# Patient Record
Sex: FEMALE
[De-identification: names, ages, dates, MRNs, and addresses within clinical notes are randomized per-mention and may not be internally consistent; named-entity substitution may affect disease eponyms.]

---

## 2019-10-14 ENCOUNTER — HOSPITAL ENCOUNTER (INPATIENT)
Dept: HOSPITAL 90 - LDH | Age: 29
LOS: 1 days | Discharge: HOME | DRG: 560 | End: 2019-10-15
Attending: OBSTETRICS & GYNECOLOGY | Admitting: OBSTETRICS & GYNECOLOGY
Payer: MEDICAID

## 2019-10-14 VITALS — WEIGHT: 189 LBS | HEIGHT: 63 IN | BODY MASS INDEX: 33.49 KG/M2

## 2019-10-14 VITALS — DIASTOLIC BLOOD PRESSURE: 70 MMHG | SYSTOLIC BLOOD PRESSURE: 121 MMHG

## 2019-10-14 VITALS — DIASTOLIC BLOOD PRESSURE: 65 MMHG | SYSTOLIC BLOOD PRESSURE: 119 MMHG

## 2019-10-14 VITALS — DIASTOLIC BLOOD PRESSURE: 72 MMHG | SYSTOLIC BLOOD PRESSURE: 112 MMHG

## 2019-10-14 DIAGNOSIS — Z3A.38: ICD-10-CM

## 2019-10-14 DIAGNOSIS — Z23: ICD-10-CM

## 2019-10-14 LAB
APPEARANCE UR: CLEAR
BILIRUB UR QL STRIP: NEGATIVE
COLOR UR: YELLOW
ERYTHROCYTE [DISTWIDTH] IN BLOOD BY AUTOMATED COUNT: 14.4 % (ref 11–15.5)
GLUCOSE UR STRIP-MCNC: NEGATIVE MG/DL
HCT VFR BLD AUTO: 34.5 % (ref 36–48)
HGB UR QL STRIP: NEGATIVE
KETONES UR STRIP-MCNC: NEGATIVE MG/DL
LEUKOCYTE ESTERASE UR QL STRIP: (no result)
MCH RBC QN AUTO: 26.6 PG (ref 27–33)
MCHC RBC AUTO-ENTMCNC: 33 G/DL (ref 32–36)
MCV RBC AUTO: 80.8 FL (ref 79–99)
NITRITE UR QL STRIP: NEGATIVE
NRBC BLD MANUAL-RTO: 0 % (ref 0–0.19)
PH UR STRIP: 6.5 [PH] (ref 5–8)
PLATELET # BLD AUTO: 241 K/UL (ref 130–400)
PROT UR QL STRIP: NEGATIVE MG/DL
RBC # BLD AUTO: 4.27 MIL/UL (ref 4–5.5)
RBC #/AREA URNS HPF: (no result) /HPF (ref 0–1)
SP GR UR STRIP: 1.02 (ref 1–1.03)
UROBILINOGEN UR STRIP-MCNC: 0.2 MG/DL (ref 0.2–1)
WBC # BLD AUTO: 9.7 K/UL (ref 4.8–10.8)
WBC #/AREA URNS HPF: (no result) /HPF (ref 0–1)

## 2019-10-14 PROCEDURE — 86592 SYPHILIS TEST NON-TREP QUAL: CPT

## 2019-10-14 PROCEDURE — 87340 HEPATITIS B SURFACE AG IA: CPT

## 2019-10-14 PROCEDURE — 81001 URINALYSIS AUTO W/SCOPE: CPT

## 2019-10-14 PROCEDURE — 85027 COMPLETE CBC AUTOMATED: CPT

## 2019-10-14 PROCEDURE — 10907ZC DRAINAGE OF AMNIOTIC FLUID, THERAPEUTIC FROM PRODUCTS OF CONCEPTION, VIA NATURAL OR ARTIFICIAL OPENING: ICD-10-PCS | Performed by: OBSTETRICS & GYNECOLOGY

## 2019-10-14 PROCEDURE — 3E0234Z INTRODUCTION OF SERUM, TOXOID AND VACCINE INTO MUSCLE, PERCUTANEOUS APPROACH: ICD-10-PCS | Performed by: ADVANCED PRACTICE MIDWIFE

## 2019-10-14 PROCEDURE — 86900 BLOOD TYPING SEROLOGIC ABO: CPT

## 2019-10-14 PROCEDURE — 36415 COLL VENOUS BLD VENIPUNCTURE: CPT

## 2019-10-14 PROCEDURE — 86901 BLOOD TYPING SEROLOGIC RH(D): CPT

## 2019-10-14 PROCEDURE — 86850 RBC ANTIBODY SCREEN: CPT

## 2019-10-14 PROCEDURE — 90715 TDAP VACCINE 7 YRS/> IM: CPT

## 2019-10-14 RX ADMIN — IBUPROFEN PRN MG: 600 TABLET ORAL at 12:30

## 2019-10-14 RX ADMIN — IBUPROFEN PRN MG: 600 TABLET ORAL at 21:10

## 2019-10-14 RX ADMIN — DOCUSATE SODIUM SCH MG: 100 TABLET, FILM COATED ORAL at 21:09

## 2019-10-14 NOTE — NUR
TDAP VACCINE

TDAP VACCINE ADMINISTERED PER PT REQUEST TO LEFT DELTOID PER ASEPTIC TECHNIQUE, TOLERATED 
WELL. LOT NUMBER [id number]/ EXPIRATION DATE AUGUST 06 2021

## 2019-10-14 NOTE — NUR
FLU VACCINE

FLU VACCINE ADMINISTERED TO RIGHT DELTOID PER ASEPTIC TECHNIQUE, TOLERATED WELL. LOT NUMBER 
[id number]

## 2019-10-15 VITALS — SYSTOLIC BLOOD PRESSURE: 98 MMHG | DIASTOLIC BLOOD PRESSURE: 45 MMHG

## 2019-10-15 VITALS — DIASTOLIC BLOOD PRESSURE: 68 MMHG | SYSTOLIC BLOOD PRESSURE: 117 MMHG

## 2019-10-15 VITALS — SYSTOLIC BLOOD PRESSURE: 125 MMHG | DIASTOLIC BLOOD PRESSURE: 71 MMHG

## 2019-10-15 LAB
ERYTHROCYTE [DISTWIDTH] IN BLOOD BY AUTOMATED COUNT: 14.2 % (ref 11–15.5)
HCT VFR BLD AUTO: 29.2 % (ref 36–48)
MCH RBC QN AUTO: 27.7 PG (ref 27–33)
MCHC RBC AUTO-ENTMCNC: 33.7 G/DL (ref 32–36)
MCV RBC AUTO: 82.1 FL (ref 79–99)
NRBC BLD MANUAL-RTO: 0 % (ref 0–0.19)
PLATELET # BLD AUTO: 178 K/UL (ref 130–400)
RBC # BLD AUTO: 3.56 MIL/UL (ref 4–5.5)
WBC # BLD AUTO: 9.2 K/UL (ref 4.8–10.8)

## 2019-10-15 RX ADMIN — DOCUSATE SODIUM SCH MG: 100 TABLET, FILM COATED ORAL at 09:04

## 2019-10-15 RX ADMIN — IBUPROFEN PRN MG: 600 TABLET ORAL at 11:43

## 2019-10-15 RX ADMIN — IBUPROFEN PRN MG: 600 TABLET ORAL at 04:00

## 2019-10-15 NOTE — NUR
INSTRUCTIONS

DISCHARGE INSTRUCTIONS READ AND EXPLAINED TO PATIENT. NO NEW PRESCRIPTIONS. QUESTIONS 
INVITED AND ANSWERED.

## 2019-10-15 NOTE — NUR
DISCHARGE

PATIENT LEFT UNIT VIA WHEELCHAIR WITH BABY IN ARMS ACCOMPANIED BY FAMILY. PERSONAL VEHICLE 
USED FOR TRANSPORTATION. BABY SECURE IN CARSEAT.

## 2019-10-15 NOTE — NUR
HX OF BIPOLAR, DEPRESSION AND ANXIETY AS TEEN



Sw met with pt who states she lives with  Madhu Katz and their 4 kids, 10,9,3, and 
NB Jeyson Katz. 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 
assistance.  works at [employer]. Couple has basic items for NB, including car 
seat and Dr Aren Howard will follow baby at dc. Pt reports she has good family support 
system - mother Cheryl Hawk [phone].

Pt reports hx of Bipolar depression and anxiety as a teen that was dx by [facility]. Pt 
reports no mental health issues in her adult life and denies any during pregnancy. pt denies 
hx of ideations, suicide attempts or post partum depression. Encourage pt to contact MD or 
OB if she starts to experience any changes in behavior or mood after dc. pt denies need for 
referral or intervention at this time

 

-------------------------------------------------------------------------------

Addendum: 10/15/19 at 0933 by CLAUDE DANIEL

-------------------------------------------------------------------------------

Amended: Links added.